# Patient Record
Sex: MALE | Race: WHITE
[De-identification: names, ages, dates, MRNs, and addresses within clinical notes are randomized per-mention and may not be internally consistent; named-entity substitution may affect disease eponyms.]

---

## 2018-10-18 ENCOUNTER — HOSPITAL ENCOUNTER (EMERGENCY)
Dept: HOSPITAL 26 - MED | Age: 53
Discharge: HOME | End: 2018-10-18
Payer: MEDICAID

## 2018-10-18 VITALS — SYSTOLIC BLOOD PRESSURE: 127 MMHG | DIASTOLIC BLOOD PRESSURE: 83 MMHG

## 2018-10-18 VITALS — HEIGHT: 66 IN | WEIGHT: 176 LBS | BODY MASS INDEX: 28.28 KG/M2

## 2018-10-18 VITALS — DIASTOLIC BLOOD PRESSURE: 69 MMHG | SYSTOLIC BLOOD PRESSURE: 112 MMHG

## 2018-10-18 DIAGNOSIS — S61.212A: ICD-10-CM
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
## 2018-10-18 DIAGNOSIS — S62.626B: Primary | ICD-10-CM

## 2018-10-18 DIAGNOSIS — S61.210A: ICD-10-CM

## 2018-10-18 DIAGNOSIS — S61.214A: ICD-10-CM
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
## 2018-10-18 PROCEDURE — 96375 TX/PRO/DX INJ NEW DRUG ADDON: CPT

## 2018-10-18 PROCEDURE — 90715 TDAP VACCINE 7 YRS/> IM: CPT

## 2018-10-18 PROCEDURE — 99152 MOD SED SAME PHYS/QHP 5/>YRS: CPT

## 2018-10-18 PROCEDURE — 96365 THER/PROPH/DIAG IV INF INIT: CPT

## 2018-10-18 PROCEDURE — 29125 APPL SHORT ARM SPLINT STATIC: CPT

## 2018-10-18 PROCEDURE — 90471 IMMUNIZATION ADMIN: CPT

## 2018-10-18 PROCEDURE — 12002 RPR S/N/AX/GEN/TRNK2.6-7.5CM: CPT

## 2018-10-18 PROCEDURE — 99285 EMERGENCY DEPT VISIT HI MDM: CPT

## 2018-10-18 PROCEDURE — 73130 X-RAY EXAM OF HAND: CPT

## 2018-10-18 PROCEDURE — 96372 THER/PROPH/DIAG INJ SC/IM: CPT

## 2018-10-18 NOTE — NUR
PT BIB SELF FOR LACERATION TO RIGHT SECOND TO 4TH DIGITS CAUSED BY A CHAINSAW 
AT WORK. PATIENT STATES HE IS IN SEVERE PAIN 10/10. PATIENT APPEARS DIAPHORETIC 
AND STATES HE FEELS HE IS GOING TO PASS OUT. PATIENT PLACED ON MONITOR. DENIES 
HX DENIES MEDS. LAST TETANUS SHOT 5YRS AGO. FINGERS ARE ACTIVELY BLEEDING. VSS; 
PATIENT POSITIONED FOR COMFORT; HOB ELEVATED; BEDRAILS UP X2; BED DOWN. ER MD 
MADE AWARE OF PT STATUS.

## 2018-10-18 NOTE — NUR
Patient discharged with v/s stable. Written and verbal after care instructions 
given and explained. 

Patient alert, oriented and verbalized understanding of instructions. 
Ambulatory with steady gait. All questions addressed prior to discharge. ID 
band removed. Patient advised to follow up with PMD. Rx of BACTRIM, MOTRIN, 
TRAMADOL given. Patient educated on indication of medication including possible 
reaction and side effects. Opportunity to ask questions provided and answered.

## 2018-10-22 ENCOUNTER — HOSPITAL ENCOUNTER (EMERGENCY)
Dept: HOSPITAL 26 - MED | Age: 53
Discharge: HOME | End: 2018-10-22
Payer: MEDICAID

## 2018-10-22 VITALS — DIASTOLIC BLOOD PRESSURE: 84 MMHG | SYSTOLIC BLOOD PRESSURE: 120 MMHG

## 2018-10-22 VITALS — BODY MASS INDEX: 26.84 KG/M2 | WEIGHT: 171 LBS | HEIGHT: 67 IN

## 2018-10-22 VITALS — SYSTOLIC BLOOD PRESSURE: 120 MMHG | DIASTOLIC BLOOD PRESSURE: 84 MMHG
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
## 2018-10-22 DIAGNOSIS — S61.212D: Primary | ICD-10-CM

## 2018-10-22 DIAGNOSIS — S61.210D: ICD-10-CM

## 2018-10-22 DIAGNOSIS — F17.200: ICD-10-CM

## 2018-10-22 DIAGNOSIS — S61.214D: ICD-10-CM

## 2018-10-22 PROCEDURE — 96372 THER/PROPH/DIAG INJ SC/IM: CPT

## 2018-10-22 PROCEDURE — 99283 EMERGENCY DEPT VISIT LOW MDM: CPT

## 2018-10-29 ENCOUNTER — HOSPITAL ENCOUNTER (EMERGENCY)
Dept: HOSPITAL 26 - MED | Age: 53
Discharge: HOME | End: 2018-10-29
Payer: MEDICAID

## 2018-10-29 VITALS — WEIGHT: 171 LBS | HEIGHT: 65 IN | BODY MASS INDEX: 28.49 KG/M2

## 2018-10-29 VITALS — DIASTOLIC BLOOD PRESSURE: 81 MMHG | SYSTOLIC BLOOD PRESSURE: 134 MMHG

## 2018-10-29 VITALS — SYSTOLIC BLOOD PRESSURE: 132 MMHG | DIASTOLIC BLOOD PRESSURE: 78 MMHG

## 2018-10-29 DIAGNOSIS — S61.212D: ICD-10-CM

## 2018-10-29 DIAGNOSIS — S61.210D: ICD-10-CM

## 2018-10-29 DIAGNOSIS — S61.214D: Primary | ICD-10-CM
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
## 2018-10-29 NOTE — NUR
53Y/M ACCOMPANIED BY SPOUSE, PT IS HERE FOR 2ND WOUND CHECK, S/P LACERATION TO 
2ND-4TH DIGITS TIP WITH CHAIN SAW 10/18/2018, SUTURES REMAIN INTACT, NO 
DRAINAGE NOTED, TETANUS IMMUNIZATION UP TO DATE. + CMS, + ROM, < 3 CAP REFILL. 
PT IS AAOX4, VSS AT THIS TIME, BED DOWN, BEDRAIL UP X 1, ER MD AWARE AND 
NOTIFIED OF PT STATUS. 



HX---DENIES

RX---NONE

## 2018-11-05 ENCOUNTER — HOSPITAL ENCOUNTER (EMERGENCY)
Dept: HOSPITAL 26 - MED | Age: 53
Discharge: HOME | End: 2018-11-05
Payer: MEDICAID

## 2018-11-05 VITALS — WEIGHT: 169.25 LBS | HEIGHT: 65 IN | BODY MASS INDEX: 28.2 KG/M2

## 2018-11-05 VITALS — SYSTOLIC BLOOD PRESSURE: 118 MMHG | DIASTOLIC BLOOD PRESSURE: 73 MMHG

## 2018-11-05 DIAGNOSIS — S61.214D: ICD-10-CM

## 2018-11-05 DIAGNOSIS — S61.212D: Primary | ICD-10-CM

## 2018-11-05 DIAGNOSIS — S61.210D: ICD-10-CM
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
